# Patient Record
(demographics unavailable — no encounter records)

---

## 2025-06-30 NOTE — ASSESSMENT
[FreeTextEntry1] : Patient with pain right heel into Achilles region.  Full examination was performed along with x-rays.  X-rays reveal a retrocalcaneal spur.  We discussed treatment options consisting pain show we saw warm compresses gentle stretching physical therapy and anti-inflammatory medication.  We also discussed proper shoe gear and possible surgery.   Will try conservative care initially if pain does not resolve we may need a surgical recommendation.  All questions answered and reviewed.

## 2025-06-30 NOTE — PHYSICAL EXAM
[General Appearance - Alert] : alert [General Appearance - In No Acute Distress] : in no acute distress [General Appearance - Well Nourished] : well nourished [General Appearance - Well Developed] : well developed [General Appearance - Well-Appearing] : healthy appearing [Ankle Swelling (On Exam)] : not present [Varicose Veins Of Lower Extremities] : not present [Delayed in the Right Toes] : capillary refills normal in right toes [Delayed in the Left Toes] : capillary refills normal in the left toes [2+] : left foot dorsalis pedis 2+ [de-identified] : Pain in right heel into right Achilles at insertion. [Skin Color & Pigmentation] : normal skin color and pigmentation [Skin Turgor] : normal skin turgor [] : no rash [Skin Lesions] : no skin lesions [Foot Ulcer] : no foot ulcer [Skin Induration] : no skin induration [Sensation] : the sensory exam was normal to light touch and pinprick [No Focal Deficits] : no focal deficits [Deep Tendon Reflexes (DTR)] : deep tendon reflexes were 2+ and symmetric [Motor Exam] : the motor exam was normal [Oriented To Time, Place, And Person] : oriented to person, place, and time [Impaired Insight] : insight and judgment were intact [Affect] : the affect was normal